# Patient Record
Sex: MALE | Race: WHITE | URBAN - METROPOLITAN AREA
[De-identification: names, ages, dates, MRNs, and addresses within clinical notes are randomized per-mention and may not be internally consistent; named-entity substitution may affect disease eponyms.]

---

## 2017-01-30 ENCOUNTER — HISTORICAL (OUTPATIENT)
Dept: RADIOLOGY | Facility: HOSPITAL | Age: 62
End: 2017-01-30

## 2021-05-21 ENCOUNTER — HISTORICAL (OUTPATIENT)
Dept: ADMINISTRATIVE | Facility: HOSPITAL | Age: 66
End: 2021-05-21

## 2021-10-15 ENCOUNTER — HISTORICAL (OUTPATIENT)
Dept: ADMINISTRATIVE | Facility: HOSPITAL | Age: 66
End: 2021-10-15

## 2021-10-15 LAB
BUN SERPL-MCNC: 11.4 MG/DL (ref 8.4–25.7)
CALCIUM SERPL-MCNC: 9.9 MG/DL (ref 8.8–10)
CHLORIDE SERPL-SCNC: 102 MMOL/L (ref 98–107)
CO2 SERPL-SCNC: 31 MMOL/L (ref 23–31)
CREAT SERPL-MCNC: 0.77 MG/DL (ref 0.73–1.18)
CREAT/UREA NIT SERPL: 15
ERYTHROCYTE [DISTWIDTH] IN BLOOD BY AUTOMATED COUNT: 13.9 % (ref 11.5–17)
GLUCOSE SERPL-MCNC: 81 MG/DL (ref 82–115)
HCT VFR BLD AUTO: 47.1 % (ref 42–52)
HGB BLD-MCNC: 15.8 GM/DL (ref 14–18)
MCH RBC QN AUTO: 33.1 PG (ref 27–31)
MCHC RBC AUTO-ENTMCNC: 33.5 GM/DL (ref 33–36)
MCV RBC AUTO: 98.7 FL (ref 80–94)
PLATELET # BLD AUTO: 308 X10(3)/MCL (ref 130–400)
PMV BLD AUTO: 10.1 FL (ref 9.4–12.4)
POTASSIUM SERPL-SCNC: 4.4 MMOL/L (ref 3.5–5.1)
RBC # BLD AUTO: 4.77 X10(6)/MCL (ref 4.7–6.1)
SARS-COV-2 RNA RESP QL NAA+PROBE: NOT DETECTED
SODIUM SERPL-SCNC: 140 MMOL/L (ref 136–145)
WBC # SPEC AUTO: 7.7 X10(3)/MCL (ref 4.5–11.5)

## 2021-10-19 ENCOUNTER — HOSPITAL ENCOUNTER (OUTPATIENT)
Dept: MEDSURG UNIT | Facility: HOSPITAL | Age: 66
End: 2021-10-20
Attending: UROLOGY | Admitting: UROLOGY

## 2021-10-19 LAB — GROUP & RH: NORMAL

## 2021-10-20 LAB
ABS NEUT (OLG): 12.65 X10(3)/MCL (ref 2.1–9.2)
BASOPHILS # BLD AUTO: 0 X10(3)/MCL (ref 0–0.2)
BASOPHILS NFR BLD AUTO: 0 %
BUN SERPL-MCNC: 15.5 MG/DL (ref 8.4–25.7)
CALCIUM SERPL-MCNC: 8.8 MG/DL (ref 8.7–10.5)
CHLORIDE SERPL-SCNC: 106 MMOL/L (ref 98–107)
CO2 SERPL-SCNC: 23 MMOL/L (ref 23–31)
CREAT SERPL-MCNC: 0.72 MG/DL (ref 0.73–1.18)
CREAT/UREA NIT SERPL: 22
EOSINOPHIL # BLD AUTO: 0.2 X10(3)/MCL (ref 0–0.9)
EOSINOPHIL NFR BLD AUTO: 1 %
ERYTHROCYTE [DISTWIDTH] IN BLOOD BY AUTOMATED COUNT: 14.2 % (ref 11.5–17)
GLUCOSE SERPL-MCNC: 99 MG/DL (ref 82–115)
HCT VFR BLD AUTO: 40.7 % (ref 42–52)
HGB BLD-MCNC: 13.9 GM/DL (ref 14–18)
LYMPHOCYTES # BLD AUTO: 1.2 X10(3)/MCL (ref 0.6–4.6)
LYMPHOCYTES NFR BLD AUTO: 8 %
MCH RBC QN AUTO: 33.2 PG (ref 27–31)
MCHC RBC AUTO-ENTMCNC: 34.2 GM/DL (ref 33–36)
MCV RBC AUTO: 97.1 FL (ref 80–94)
MONOCYTES # BLD AUTO: 1.3 X10(3)/MCL (ref 0.1–1.3)
MONOCYTES NFR BLD AUTO: 8 %
NEUTROPHILS # BLD AUTO: 12.65 X10(3)/MCL (ref 2.1–9.2)
NEUTROPHILS NFR BLD AUTO: 82 %
PLATELET # BLD AUTO: 260 X10(3)/MCL (ref 130–400)
PMV BLD AUTO: 9.7 FL (ref 9.4–12.4)
POTASSIUM SERPL-SCNC: 4.2 MMOL/L (ref 3.5–5.1)
RBC # BLD AUTO: 4.19 X10(6)/MCL (ref 4.7–6.1)
SODIUM SERPL-SCNC: 138 MMOL/L (ref 136–145)
WBC # SPEC AUTO: 15.4 X10(3)/MCL (ref 4.5–11.5)

## 2022-04-30 NOTE — OP NOTE
Patient:   Julio Kaba            MRN: [id number]            FIN: [id number]               Age:   66 years     Sex:  Male     :  1955   Associated Diagnoses:   None   Author:   Isaac Shepherd MD      PREOPERATIVE DIAGNOSIS(ES):  Prostate cancer,     PROCEDURE(S)/OPERATION(S) PERFORMED:  1. Robot assist laparoscopic radical prostatectomy.  2. Robot assist bilateral pelvic lymph node dissection.    FINDINGS:  1. There was no evidence of prostate cancer outside the prostate.  2. IVA revealed normal  3. He had a bladder neck sparing procedure.  4. His urethral bladder anastomosis was tested with 120 mL of normal saline. There was no  extravasation seen.  5.  Bilateral neurovascular bundles were spared  6. Endopelvic fascia was spared.    SPECIMENS:  1. Prostate plus seminal vesicles en bloc.  2. Anterior bladder neck lymph nodes  3. External iliac lymph nodes, internal iliac lymph nodes, and obturator lymph  nodes Bilaterally    FLUIDS:  1500 mL crystalloid.    ESTIMATED BLOOD LOSS:  150 ml     DRAINS:  1. 19-Maltese Osmel drain.  2. 18-Maltese Lima catheter.    CONDITION:  Stable to PACU.    INDICATION FOR PROCEDURE:  This is a 66 Male, who was found to have an elevated PSA. He then underwent a  prostate biopsy, which confirmed the above mentioned Opdyke score and clinical stage.  Transrectal ultrasound prostate volume was 40 cubic centimeters. He was apprised of his  options. He elected to proceed with robotic-assisted laparoscopic prostatectomy. Consents  were signed. He understands the risk and complications and would like to proceed with surgery.  PCDs and heparin were used for DVT prophylaxis. Appropriate antibiotics were used for  antibiotic prophylaxis.    SUMMARY:  After adequate general anesthetic, he was carefully positioned in low lithotomy. His arms were  tucked at his sides. All pressure points were carefully padded to prevent neuromuscular injury.  The table was placed in a steep  Trendelenburg position. The abdomen and genitalia were  shaved and prepped and draped sterile fashion. A time-out was performed with two patient identifiers.  A 16-Luxembourgish 2-way Lima catheter was placed in the bladder with 10 mL of sterile water in the  balloon, and the bladder was drained.    A Veress needle was used to access the peritoneal cavity at the umbilicus. Saline drop test and  advancement tests were negative. The abdomen was insufflated at low insufflation pressures of  CO2 gas. We insufflated with low flow and initial pressures below 4 mmHg and gradually  insufflated up to 15 mmHg. A 1 cm incision was made just above the belly button horizontally  and a 10-mm trocar camera trocar was inserted. Initial laparoscopy revealed findings  as  noted above.    We then placed 2 robotic trocars on either side of the midline measuring 18 cm from the midline  at the pubic bone up to the port sites which were 9 cm lateral of the umbilicus and another robot  trocar was placed in the left lateral abdomen two finger breaths off the left ASIC. Two assistant  ports were placed, one 12 mm Surgiquest trocar in the right lateral abdomen two finger breaths  off the right ASIC and another 5mm trocar in the right subcostal position. All ports were placed  under direct vision.    The robot was then docked.  We then took down some adhesions of the sigmoid colon in  order to fully retract the rectum out of the pelvis.After this, the peritoneum between the bladder   and the rectum was incised to expose the seminal vesicles and vas deferens. The seminal vesicles   were then swept up off denovillier's fascia. We then clipped and divided the vas deferens at the tip of the  seminal vesicles and then clipped and divided the vascular structures on the posterior lateral  edges of the seminal vesicles. The SVs and ampulla of the Vas were then dissected down into  their insertion into the prostate. We then incised through Denonvilliers  fascia with cold scissors  and swept the prostate up off the rectum exposing the vascular bundles laterally and the apex of  the prostate in the midline.    After this was done, we then performed a bilateral extended pelvic lymph node dissection on  both the left and right-hand side. The superior borders of dissection were the lateral border of  the external iliac arter, medially was where the ureter crosses over the external iliac artery, and  lateral where the circumflex iliac vein crosses over the external iliac artery. The distal medial  boundary was the bladder and the distal lateral boundary was the obturator fossa. The inferior  boundary was the endopelvic fascia. During the course of the dissection, the obturator nerve  was identified and kept out of harm's way. The bilateral maame packets taken were the external  iliac lymph nodes, internal iliac lymph nodes and obturator lymph nodes. A piece of Nuknit  hemostatic material was placed into the obturator fossa bilaterally.    The bladder was then dropped entering the space of Retzius by incising lateral to the medial  umbilical ligament on either side up until where they joined. The vas deferens were then  clipped and divided. Endopelvic fascia was exposed but not divided. We then cauterized and  divided the superficial dorsal vein and removed the anterior bladder neck fat and lymph nodes  overlying the junction of the bladder and the prostate.    We then switched to a 30-degree telescope and performed the bladder neck dissection by  entering the retrovesical space through the space of Santosh on both the right and left-hand  sides. The bladder neck was then disected away from the prostate in a lateral to medial and  posterior to anterior fashion. The urethra and bladder neck were then isolated, where it funnels  into the prostate. It was then divided. The bladder neck was spared to the size of the 16fr paz.  We then switched back to a 0-degree telescope and  completed the posterior dissection, better  exposing the apex of the prostate and the vascular bundles laterally.    After taking down the prostate pedicle between clips, a Bilateral intrafascial nerve sparing  procedure was performed. The anterior veil structure were taken down at 10 and 2 O'clock and the DVC was then  exposed. We then stapled the DVC with one application of an Ethicon 45-mm stapling device  using a blue load making sure the paz catheter moved easily to protect the urethra. We then  performed the apical dissection and prepared the urethra.    We then divided the urethra anteriorly and placed a 6 o'clock 2-0 Vicryl stitch into the urethra.  After this was done, we then divided the posterior urethra. The prostate was then bagged and  placed out of harm's way in the right upper quadrant. We then used two baby laps to hold  pressure in the pelvis for a minute. After this, we then assessed for Hemostasis.   Hemostasis was adequate    We then performed a Johnny posterior reconstruction with a 3-0 V lock suture. After this was  done, we then placed a 6 o'clock urethral stitch into the 6 o'clock position on the bladder and  then tied this down. We then performed the vesicourethral anastomosis using two 3-0 V lock  sutures looped together running them from the 5 and 7 o'clock position on each side up to the  12 o'clock position. The two were then tied together.We then tested the anastomosis with 120 mL   of normal saline. There was no extravasation seen. The 16-Romanian Paz catheter was removed   and a new 18-Romanian Paz catheter was placed with 10 mL in the balloon.    The robot was then undocked, and a drain was placed in the pelvis through the left iliac port.  The drain was secured with 3-0 nylon. The right lower quadrant 12-mm port site was closed  with 0-Vicryl interrupted suture using the Sami-Sheryl fascial closure device under vision.  We then visualized all the ports as they were removed.  There was no bleeding seen from any of  the sites. We then widened the midline fascia of the camera port site, removed the specimen  and inspected it and sent it to Pathology.    The fascia was reapproximated with a running 0-Vicryl suture. All wounds were infiltrated with  0.5% Marcaine and  experell a total of 50 mL. The skin edges were  reapproximated using a running subcuticular 4-0 Monocryl suture. Dermabond dressing was  applied to the skin incisions. All sponge and needle counts were correct x2.    The patient tolerated the procedure well. Catheter and drain were secured to the left leg. He  was extubated and transported to the recovery room in stable condition. His family was  informed of the outcome following the procedure.

## 2022-05-03 NOTE — HISTORICAL [facility] CERNER
This is a historical note converted from Tavo. Formatting and pictures may have been removed.  Please reference Tavo for original formatting and attached multimedia. Admit and Discharge Dates  Admit Date: 10/19/2021  Discharge Date: 10/20/2021  Physicians  Attending Physician - Cora WILLIS, Isaac CHAVEZ  Admitting Physician - Cora WILLIS, Isaac CHAVEZ  Primary Care Physician - Jonathan WILLIS, Trina ARTHUR  Discharge Diagnosis  State cancer  Surgical Procedures  10/19/2021 - [id number] - Laparoscopic Prostatectomy Robtic Assist Xi  Immunizations  No immunizations recorded for this visit.  Admission Information  Mr. Kaba is a 66-year-old male?admitted for reservation following a?planned?laparoscopic robot-assisted?radical prostatectomy with bilateral pelvic lymph node dissection.? Procedure went as planned without any complications. ? ml. ?Tolerating?regular diet without nausea. ?Denies flatus.?Ambulating.? Has remained hemodynamically stable throughout hospitalization. ?Discharge instructions discussed. ?No heavy lifting, bending, pushing, pulling or?straining.? Okay to shower, do not soak.? Patient already has prescriptions and follow-up appointment.  ?  Todays labs: WBC 15.4, H&H 13.9 and 40.7, BUN and creatinine 15.5 and 0.72  Vital signs stable, afebrile  400 mL urine output overnight  80 mL?serosanguineous output?to left OLI  Significant Findings  None  Time Spent on discharge  31 minutes  Objective  Vitals & Measurements  T:?36.8? ?C (Oral)? TMIN:?34.3? ?C (Oral)? TMAX:?36.9? ?C (Oral)? HR:?85(Peripheral)? RR:?20? BP:?113/59? SpO2:?89%?  Physical Exam  General:?Alert and oriented, No acute distress.  Genitourinary: Naik yellow urine draining to  bag  Abdomen: Left lower OLI?drain intact, all surgical incisions dry and intact without?redness or drainage, soft,?bowel sounds present  Patient Discharge Condition  Stable  Discharge Disposition  Home   Discharge Medication Reconciliation  Discharge Med Rec is not  complete  Car Seat Challenge  No Qualifying Data      ?  I saw and evaluated the patient.  ?  ?I agree with the findings and the plan of care as documented in the Rhoda Hernandez note.